# Patient Record
Sex: FEMALE | Race: WHITE | HISPANIC OR LATINO | URBAN - METROPOLITAN AREA
[De-identification: names, ages, dates, MRNs, and addresses within clinical notes are randomized per-mention and may not be internally consistent; named-entity substitution may affect disease eponyms.]

---

## 2023-10-18 ENCOUNTER — LAB OUTSIDE AN ENCOUNTER (OUTPATIENT)
Dept: URBAN - METROPOLITAN AREA CLINIC 66 | Facility: CLINIC | Age: 53
End: 2023-10-18

## 2023-10-18 ENCOUNTER — TELEPHONE ENCOUNTER (OUTPATIENT)
Dept: URBAN - METROPOLITAN AREA CLINIC 7 | Facility: CLINIC | Age: 53
End: 2023-10-18

## 2023-10-18 ENCOUNTER — OFFICE VISIT (OUTPATIENT)
Dept: URBAN - METROPOLITAN AREA CLINIC 66 | Facility: CLINIC | Age: 53
End: 2023-10-18
Payer: COMMERCIAL

## 2023-10-18 VITALS
HEART RATE: 84 BPM | BODY MASS INDEX: 19.83 KG/M2 | WEIGHT: 101 LBS | HEIGHT: 60 IN | OXYGEN SATURATION: 98 % | DIASTOLIC BLOOD PRESSURE: 60 MMHG | SYSTOLIC BLOOD PRESSURE: 90 MMHG

## 2023-10-18 DIAGNOSIS — K21.9 CHRONIC GERD: ICD-10-CM

## 2023-10-18 DIAGNOSIS — K58.0 IRRITABLE BOWEL SYNDROME WITH DIARRHEA: ICD-10-CM

## 2023-10-18 DIAGNOSIS — R19.7 DIARRHEA: ICD-10-CM

## 2023-10-18 PROBLEM — 235595009: Status: ACTIVE | Noted: 2023-10-18

## 2023-10-18 PROBLEM — 197125005: Status: ACTIVE | Noted: 2023-10-18

## 2023-10-18 PROCEDURE — 99204 OFFICE O/P NEW MOD 45 MIN: CPT

## 2023-10-18 RX ORDER — SACCHAROMYCES BOULARDII 250 MG
AS DIRECTED CAPSULE ORAL
Status: ACTIVE | COMMUNITY

## 2023-10-18 RX ORDER — LACTOBACIL 2/BIFIDO 1/S.THERMO 900B CELL
AS DIRECTED PACKET (EA) ORAL
Status: ACTIVE | COMMUNITY

## 2023-10-18 RX ORDER — DICYCLOMINE HYDROCHLORIDE 10 MG/1
2 CAPSULES CAPSULE ORAL THREE TIMES A DAY
Status: ACTIVE | COMMUNITY

## 2023-10-18 NOTE — HPI-TODAY'S VISIT:
51 y/o F with personal history of IBS-D and GERD, presenting for diarrheal episodes with assoc abdominal cramping and bloating. Pt reports recent "flare" of her IBS, described as multiple diarrheal episodes and abdominal discomfort/cramping. She does take prn dicyclomine which has helped with her discomfort. Pt states increased stress due to recent relocation and upsetting news that her son's music school focusing in autistic children was suddenly closed after the move. She additionally utilizes metamucil for fiber although she does find this bloating. Otherwise, pt noted hx GERD and states infrequent breakthrough symptoms with large meals, but is otherwise well controlled with dietary modifications alone. Discussed stool studies to r/o bacterial or infectious etiology. Also, recommend breath test for r/o SIBO. Can trial benefiber vs metamucil. Patient denies nausea, vomiting, dysphagia, odynophagia, abdominal pain, diarrhea, constipation, GI bleeding, or unintentional weight loss.

## 2023-10-20 ENCOUNTER — OFFICE VISIT (OUTPATIENT)
Dept: URBAN - METROPOLITAN AREA CLINIC 68 | Facility: CLINIC | Age: 53
End: 2023-10-20

## 2023-10-30 ENCOUNTER — TELEPHONE ENCOUNTER (OUTPATIENT)
Dept: URBAN - METROPOLITAN AREA CLINIC 68 | Facility: CLINIC | Age: 53
End: 2023-10-30

## 2023-10-30 LAB
CALPROTECTIN, FECAL: <5
CAMPYLOBACTER SPP. AG,EIA: (no result)
CLOSTRIDIUM DIFFICILE: (no result)
LACTOFERRIN, FECAL, QUANT.: <6.25
OVA AND PARASITES, CONC AND PERM SMEAR: (no result)
SALMONELLA AND SHIGELLA, CULTURE: (no result)
SHIGA TOXINS, EIA W/RFL TO E.COLI O157 CULTURE: (no result)

## 2023-10-31 ENCOUNTER — TELEPHONE ENCOUNTER (OUTPATIENT)
Dept: URBAN - METROPOLITAN AREA CLINIC 68 | Facility: CLINIC | Age: 53
End: 2023-10-31

## 2023-11-09 ENCOUNTER — OFFICE VISIT (OUTPATIENT)
Dept: URBAN - METROPOLITAN AREA CLINIC 67 | Facility: CLINIC | Age: 53
End: 2023-11-09

## 2023-11-16 ENCOUNTER — OFFICE VISIT (OUTPATIENT)
Dept: URBAN - METROPOLITAN AREA CLINIC 68 | Facility: CLINIC | Age: 53
End: 2023-11-16

## 2024-02-02 ENCOUNTER — OV EP (OUTPATIENT)
Dept: URBAN - METROPOLITAN AREA CLINIC 68 | Facility: CLINIC | Age: 54
End: 2024-02-02
Payer: COMMERCIAL

## 2024-02-02 VITALS
DIASTOLIC BLOOD PRESSURE: 60 MMHG | SYSTOLIC BLOOD PRESSURE: 112 MMHG | WEIGHT: 101 LBS | BODY MASS INDEX: 19.83 KG/M2 | HEIGHT: 60 IN

## 2024-02-02 DIAGNOSIS — Z87.19 HISTORY OF IBS: ICD-10-CM

## 2024-02-02 DIAGNOSIS — K31.7 GASTRIC POLYP: ICD-10-CM

## 2024-02-02 DIAGNOSIS — R10.9 ABDOMINAL CRAMPING: ICD-10-CM

## 2024-02-02 PROBLEM — 247330004: Status: ACTIVE | Noted: 2024-02-02

## 2024-02-02 PROBLEM — 78809005: Status: ACTIVE | Noted: 2024-02-02

## 2024-02-02 PROCEDURE — 99214 OFFICE O/P EST MOD 30 MIN: CPT | Performed by: INTERNAL MEDICINE

## 2024-02-02 RX ORDER — DICYCLOMINE HYDROCHLORIDE 10 MG/1
1 CAPSULE 30 MINUTES BEFORE EATING CAPSULE ORAL DAILY
Qty: 30 CAPSULE | Refills: 4 | OUTPATIENT
Start: 2024-02-02 | End: 2024-07-01

## 2024-02-02 RX ORDER — RIFAXIMIN 550 MG/1
1 TABLET TABLET ORAL THREE TIMES A DAY
Qty: 42 TABLET | OUTPATIENT
Start: 2024-02-02 | End: 2024-02-16

## 2024-02-02 RX ORDER — LACTOBACIL 2/BIFIDO 1/S.THERMO 900B CELL
AS DIRECTED PACKET (EA) ORAL
Status: ACTIVE | COMMUNITY

## 2024-02-02 RX ORDER — DICYCLOMINE HYDROCHLORIDE 10 MG/1
2 CAPSULES CAPSULE ORAL THREE TIMES A DAY
Status: ACTIVE | COMMUNITY

## 2024-02-02 RX ORDER — SACCHAROMYCES BOULARDII 250 MG
AS DIRECTED CAPSULE ORAL
Status: ACTIVE | COMMUNITY

## 2024-02-02 RX ORDER — LACTOBACIL 2/BIFIDO 1/S.THERMO 900B CELL
AS DIRECTED PACKET (EA) ORAL TWICE A DAY
Qty: 60 PACKET | Refills: 11 | OUTPATIENT
Start: 2024-02-02 | End: 2025-01-27

## 2024-02-02 NOTE — PHYSICAL EXAM NEUROLOGIC:
ID band present and verified. Family is in the waiting area. oriented to person, place and time , normal sensation , short and long term memory intact

## 2024-02-02 NOTE — HPI-TODAY'S VISIT:
53 y.o. WF with IBS, internal hemorrhoids, gastric polyps who is here for follow up. She did have an EGD and colonoscopy in 2021 which showed gastric polyps, hemorrhoids, mild gastritis. Will request pathology for review. She has tried Ibgard and does get heartburn. She has tried Hyoscyamine and did get headaches. She does feel that Dicyclomine 10mg prn for cramping which does help. She appears to have tried Xifaxan in the past. She does follow a low FODMAP diet and takes Metamucil daily. She has noticed change in bowel habits with her hormonal changes.

## 2024-06-03 ENCOUNTER — TELEPHONE ENCOUNTER (OUTPATIENT)
Dept: URBAN - METROPOLITAN AREA CLINIC 68 | Facility: CLINIC | Age: 54
End: 2024-06-03

## 2024-06-04 ENCOUNTER — OFFICE VISIT (OUTPATIENT)
Dept: URBAN - METROPOLITAN AREA CLINIC 68 | Facility: CLINIC | Age: 54
End: 2024-06-04
Payer: COMMERCIAL

## 2024-06-04 VITALS
SYSTOLIC BLOOD PRESSURE: 128 MMHG | TEMPERATURE: 97.7 F | WEIGHT: 101 LBS | OXYGEN SATURATION: 98 % | HEIGHT: 60 IN | HEART RATE: 71 BPM | DIASTOLIC BLOOD PRESSURE: 60 MMHG | BODY MASS INDEX: 19.83 KG/M2

## 2024-06-04 DIAGNOSIS — R19.7 DIARRHEA: ICD-10-CM

## 2024-06-04 DIAGNOSIS — Z87.19 HISTORY OF IBS: ICD-10-CM

## 2024-06-04 DIAGNOSIS — Z86.010 HISTORY OF COLON POLYPS: ICD-10-CM

## 2024-06-04 PROBLEM — 428283002: Status: ACTIVE | Noted: 2024-06-04

## 2024-06-04 PROBLEM — 70871000119100: Status: ACTIVE | Noted: 2024-06-04

## 2024-06-04 PROCEDURE — 99214 OFFICE O/P EST MOD 30 MIN: CPT

## 2024-06-04 RX ORDER — DICYCLOMINE HYDROCHLORIDE 10 MG/1
1 CAPSULE 30 MINUTES BEFORE EATING CAPSULE ORAL DAILY
Qty: 30 CAPSULE | Refills: 4 | Status: ACTIVE | COMMUNITY
Start: 2024-02-02 | End: 2024-07-01

## 2024-06-04 RX ORDER — LACTOBACIL 2/BIFIDO 1/S.THERMO 900B CELL
AS DIRECTED PACKET (EA) ORAL TWICE A DAY
Qty: 60 PACKET | Refills: 11 | Status: ACTIVE | COMMUNITY
Start: 2024-02-02 | End: 2025-01-27

## 2024-06-04 RX ORDER — LACTOBACIL 2/BIFIDO 1/S.THERMO 900B CELL
AS DIRECTED PACKET (EA) ORAL
Status: ACTIVE | COMMUNITY

## 2024-06-04 RX ORDER — SACCHAROMYCES BOULARDII 250 MG
AS DIRECTED CAPSULE ORAL
Status: ACTIVE | COMMUNITY

## 2024-06-04 RX ORDER — DICYCLOMINE HYDROCHLORIDE 10 MG/1
2 CAPSULES CAPSULE ORAL THREE TIMES A DAY
Status: ACTIVE | COMMUNITY

## 2024-06-04 NOTE — HPI-TODAY'S VISIT:
54 y/o F with history of colon polyps (colonoscopy 2021), IBS, internal hemorrhoids, gastric polyps, presenting for evaluation of newly onset diarrhea s/p [place] salad [place] an airport while [place] in [place]. She notes persistent non-bloody diarrhea and states she was evaluated at [facility] beds at the [facility] ED, although she did not complete stool studies. She did have labs negative for leukocytosis and was advised of possible gastroenteritis and discharged after receiving IV fluids and recommended to start bentyl. Pt advised to complete stool studies and can start IBgard and Metamucil in the meantime. Patient denies nausea, vomiting, dysphagia, odynophagia, heartburn, abdominal pain, diarrhea, constipation, GI bleeding, or unintentional weight loss

## 2024-06-07 ENCOUNTER — TELEPHONE ENCOUNTER (OUTPATIENT)
Dept: URBAN - METROPOLITAN AREA CLINIC 68 | Facility: CLINIC | Age: 54
End: 2024-06-07

## 2024-06-07 ENCOUNTER — WEB ENCOUNTER (OUTPATIENT)
Dept: URBAN - METROPOLITAN AREA CLINIC 68 | Facility: CLINIC | Age: 54
End: 2024-06-07

## 2024-06-07 RX ORDER — AZITHROMYCIN MONOHYDRATE 500 MG/1
1 TABLET TABLET, FILM COATED ORAL ONCE DAILY
Qty: 3 | Refills: 0 | OUTPATIENT
Start: 2024-06-10 | End: 2024-06-13

## 2024-06-10 ENCOUNTER — WEB ENCOUNTER (OUTPATIENT)
Dept: URBAN - METROPOLITAN AREA CLINIC 68 | Facility: CLINIC | Age: 54
End: 2024-06-10

## 2024-06-10 ENCOUNTER — DASHBOARD ENCOUNTERS (OUTPATIENT)
Age: 54
End: 2024-06-10

## 2024-06-10 ENCOUNTER — TELEPHONE ENCOUNTER (OUTPATIENT)
Dept: URBAN - METROPOLITAN AREA CLINIC 68 | Facility: CLINIC | Age: 54
End: 2024-06-10

## 2024-06-10 PROBLEM — 19213003: Status: ACTIVE | Noted: 2024-06-10

## 2024-06-10 RX ORDER — AZITHROMYCIN MONOHYDRATE 500 MG/1
1 TABLET TABLET, FILM COATED ORAL DAILY
Qty: 3 TABLET | Refills: 0 | OUTPATIENT
Start: 2024-06-11 | End: 2024-06-14

## 2024-06-11 ENCOUNTER — WEB ENCOUNTER (OUTPATIENT)
Dept: URBAN - METROPOLITAN AREA CLINIC 68 | Facility: CLINIC | Age: 54
End: 2024-06-11

## 2024-06-12 ENCOUNTER — TELEPHONE ENCOUNTER (OUTPATIENT)
Dept: URBAN - METROPOLITAN AREA CLINIC 68 | Facility: CLINIC | Age: 54
End: 2024-06-12

## 2024-06-12 ENCOUNTER — WEB ENCOUNTER (OUTPATIENT)
Dept: URBAN - METROPOLITAN AREA CLINIC 68 | Facility: CLINIC | Age: 54
End: 2024-06-12

## 2025-04-02 ENCOUNTER — TELEPHONE ENCOUNTER (OUTPATIENT)
Dept: URBAN - METROPOLITAN AREA CLINIC 68 | Facility: CLINIC | Age: 55
End: 2025-04-02

## 2025-04-02 RX ORDER — LACTOBACIL 2/BIFIDO 1/S.THERMO 900B CELL
AS DIRECTED PACKET (EA) ORAL TWICE A DAY
Qty: 60 CAPSULE | Refills: 11

## 2025-04-03 ENCOUNTER — TELEPHONE ENCOUNTER (OUTPATIENT)
Dept: URBAN - METROPOLITAN AREA CLINIC 68 | Facility: CLINIC | Age: 55
End: 2025-04-03

## 2025-04-03 RX ORDER — LACTOBACIL 2/BIFIDO 1/S.THERMO 900B CELL
AS DIRECTED PACKET (EA) ORAL TWICE A DAY
Qty: 60 PACKET | Refills: 11
Start: 2024-02-02 | End: 2026-03-29

## 2025-04-08 ENCOUNTER — TELEPHONE ENCOUNTER (OUTPATIENT)
Dept: URBAN - METROPOLITAN AREA CLINIC 68 | Facility: CLINIC | Age: 55
End: 2025-04-08

## 2025-04-09 ENCOUNTER — TELEPHONE ENCOUNTER (OUTPATIENT)
Dept: URBAN - METROPOLITAN AREA CLINIC 68 | Facility: CLINIC | Age: 55
End: 2025-04-09

## 2025-04-09 RX ORDER — LACTOBACIL 2/BIFIDO 1/S.THERMO 900B CELL
AS DIRECTED PACKET (EA) ORAL TWICE A DAY
Qty: 60 PACKET | Refills: 11
Start: 2024-02-02 | End: 2026-04-04

## 2025-06-06 ENCOUNTER — OFFICE VISIT (OUTPATIENT)
Dept: URBAN - METROPOLITAN AREA CLINIC 68 | Facility: CLINIC | Age: 55
End: 2025-06-06
Payer: COMMERCIAL

## 2025-06-06 ENCOUNTER — TELEPHONE ENCOUNTER (OUTPATIENT)
Dept: URBAN - METROPOLITAN AREA CLINIC 68 | Facility: CLINIC | Age: 55
End: 2025-06-06

## 2025-06-06 DIAGNOSIS — Z87.19 HISTORY OF IBS: ICD-10-CM

## 2025-06-06 DIAGNOSIS — K21.9 CHRONIC GERD: ICD-10-CM

## 2025-06-06 DIAGNOSIS — R14.0 GASES: ICD-10-CM

## 2025-06-06 PROBLEM — 116289008: Status: ACTIVE | Noted: 2025-06-06

## 2025-06-06 PROCEDURE — 99213 OFFICE O/P EST LOW 20 MIN: CPT | Performed by: INTERNAL MEDICINE

## 2025-06-06 RX ORDER — SACCHAROMYCES BOULARDII 250 MG
AS DIRECTED CAPSULE ORAL
Status: DISCONTINUED | COMMUNITY

## 2025-06-06 RX ORDER — DICYCLOMINE HYDROCHLORIDE 10 MG/1
2 CAPSULES CAPSULE ORAL THREE TIMES A DAY
Status: DISCONTINUED | COMMUNITY

## 2025-06-06 RX ORDER — LACTOBACIL 2/BIFIDO 1/S.THERMO 900B CELL
AS DIRECTED PACKET (EA) ORAL TWICE A DAY
Qty: 60 CAPSULE | Refills: 11 | Status: DISCONTINUED | COMMUNITY

## 2025-06-06 RX ORDER — LACTOBACIL 2/BIFIDO 1/S.THERMO 900B CELL
AS DIRECTED PACKET (EA) ORAL TWICE A DAY
Qty: 60 PACKET | Refills: 11 | Status: ACTIVE | COMMUNITY
Start: 2024-02-02 | End: 2026-04-04

## 2025-06-06 NOTE — HPI-TODAY'S VISIT:
54 y.o. WF with history of IBS and chronic GERD who is here for follow up. She is s/p an EGD and colonoscopy in 8/2021 at [facility] in [place] and she did have mild gastritis and there was questionable polyp. The reports were reviewed and will request pathology report. Per the patient, her previous doctor told her she should do her next colonoscopy in 10 years. She is perimenopausal and has some bloating and gas trapping at times. She does take VSL#3 one packet a day which does help. She has tried Ibgard in the past; however, this caused some heartburn. She does have some reflux intermittently and follow anti-reflux measures. She has taken Dicyclomine and Levsin in the past.